# Patient Record
Sex: MALE | Race: WHITE | URBAN - NONMETROPOLITAN AREA
[De-identification: names, ages, dates, MRNs, and addresses within clinical notes are randomized per-mention and may not be internally consistent; named-entity substitution may affect disease eponyms.]

---

## 2022-03-01 ENCOUNTER — OFFICE VISIT (OUTPATIENT)
Dept: URBAN - NONMETROPOLITAN AREA CLINIC 8 | Facility: CLINIC | Age: 47
End: 2022-03-01

## 2022-03-01 DIAGNOSIS — D31.32 BENIGN NEOPLASM OF LEFT CHOROID: ICD-10-CM

## 2022-03-01 DIAGNOSIS — H11.153 PINGUECULA, BILATERAL: ICD-10-CM

## 2022-03-01 DIAGNOSIS — E11.9 TYPE 2 DIABETES MELLITUS W/O COMPLICATION: ICD-10-CM

## 2022-03-01 DIAGNOSIS — H25.13 AGE-RELATED NUCLEAR CATARACT, BILATERAL: ICD-10-CM

## 2022-03-01 DIAGNOSIS — H53.10 UNSPECIFIED SUBJECTIVE VISUAL DISTURBANCES: Primary | ICD-10-CM

## 2022-03-01 PROCEDURE — 92004 COMPRE OPH EXAM NEW PT 1/>: CPT | Performed by: OPTOMETRIST

## 2022-03-01 ASSESSMENT — INTRAOCULAR PRESSURE
OD: 12
OS: 12

## 2022-03-01 ASSESSMENT — VISUAL ACUITY
OS: 20/20
OD: 20/20

## 2022-03-01 NOTE — IMPRESSION/PLAN
Impression: Unspecified subjective visual disturbances: H53.10. Plan: There is no evidence of retinal pathology. All signs and risks of retinal detachment and tears were discussed in detail. The possibility of vitreoretinal traction was explained and patient instructed to call the office immediately if any new symptoms noted or symptoms change.   Recommend pt RTC 1 month for f/u DFE with Neurologist or ophthalmologist.

## 2022-03-01 NOTE — IMPRESSION/PLAN
Impression: Type 2 diabetes mellitus w/o complication: W10.8. Plan: Controlled NIDDM without background DR, neovascularization, or DME OU. Pt ed on importance of good blood glucose control for systemic and ocular health. Emphasized importance of annual dilated exams.